# Patient Record
Sex: FEMALE | Race: WHITE | NOT HISPANIC OR LATINO
[De-identification: names, ages, dates, MRNs, and addresses within clinical notes are randomized per-mention and may not be internally consistent; named-entity substitution may affect disease eponyms.]

---

## 2021-05-25 PROBLEM — Z00.00 ENCOUNTER FOR PREVENTIVE HEALTH EXAMINATION: Status: ACTIVE | Noted: 2021-05-25

## 2021-05-26 ENCOUNTER — APPOINTMENT (OUTPATIENT)
Dept: OBGYN | Facility: CLINIC | Age: 36
End: 2021-05-26
Payer: COMMERCIAL

## 2021-05-26 VITALS
HEIGHT: 63 IN | BODY MASS INDEX: 28.35 KG/M2 | WEIGHT: 160 LBS | DIASTOLIC BLOOD PRESSURE: 80 MMHG | SYSTOLIC BLOOD PRESSURE: 120 MMHG

## 2021-05-26 DIAGNOSIS — F17.200 NICOTINE DEPENDENCE, UNSPECIFIED, UNCOMPLICATED: ICD-10-CM

## 2021-05-26 DIAGNOSIS — Z78.9 OTHER SPECIFIED HEALTH STATUS: ICD-10-CM

## 2021-05-26 DIAGNOSIS — Z87.09 PERSONAL HISTORY OF OTHER DISEASES OF THE RESPIRATORY SYSTEM: ICD-10-CM

## 2021-05-26 PROCEDURE — 99214 OFFICE O/P EST MOD 30 MIN: CPT | Mod: 25

## 2021-05-26 PROCEDURE — 99385 PREV VISIT NEW AGE 18-39: CPT

## 2021-05-26 PROCEDURE — 99072 ADDL SUPL MATRL&STAF TM PHE: CPT

## 2021-05-26 NOTE — HISTORY OF PRESENT ILLNESS
[FreeTextEntry1] : 35 year old female presents for wwe.  She is a new patient to our practice.  Her last gyn visit was about 5 years ago.  She would like to discuss her menses today.  Menarche was at age 13.  Menses are q 28 days, lasting 5-8 days.  She states the first 3 days are heavy.  She does get very bad cramping.  She states the cramping is so bad that she gets nauseous and occasionally vomits.  She takes NSAIDs and tylenol which take the edge off.  She denies dyspareunia.  She sometimes has dyschezia.  She has no history of ovarian cysts, fibroids, endometriosis, STD's or abnormal pap's.  She has a history fo breast cysts.  She is sexually active and using condoms.  She is considering trying to conceive in the near future.  \par \par PMH is significant for asthma.  PSH is significant for a CS and tonsillectomy.  She does not know her family history as she is adopted.  She does smoke a few cigarettes per day.  She socially drinks alcohol.  Denies illicit drugs.  Gyn history as above.  OB history:  (FT CS).  NKDA.  She does not take any medications.

## 2021-05-26 NOTE — PLAN
[FreeTextEntry1] : 35 year old female wwe\par \par 1. Pap done\par 2. SBE reviewed\par 3. Heavy menses and dysmenorrhea.  Differential diagnosis discussed with the patient at length.  Plan to get CBC and TSH.  Will RTO for tv sono and to review blood work results.  We discussed potential treatment options.  Patient is unsure if she would like to try to conceive soon or wait.  She will discuss with her  before her next visit.  The opportunity was given to ask questions and all were answered to her satisfaction.

## 2021-06-03 LAB
CYTOLOGY CVX/VAG DOC THIN PREP: ABNORMAL
HPV HIGH+LOW RISK DNA PNL CVX: NOT DETECTED

## 2021-07-05 LAB
BASOPHILS # BLD AUTO: 0.09 K/UL
BASOPHILS NFR BLD AUTO: 1 %
EOSINOPHIL # BLD AUTO: 0.41 K/UL
EOSINOPHIL NFR BLD AUTO: 4.5 %
HCT VFR BLD CALC: 43.7 %
HGB BLD-MCNC: 14.7 G/DL
IMM GRANULOCYTES NFR BLD AUTO: 0.2 %
LYMPHOCYTES # BLD AUTO: 3.43 K/UL
LYMPHOCYTES NFR BLD AUTO: 37.9 %
MAN DIFF?: NORMAL
MCHC RBC-ENTMCNC: 32 PG
MCHC RBC-ENTMCNC: 33.6 GM/DL
MCV RBC AUTO: 95.2 FL
MONOCYTES # BLD AUTO: 0.49 K/UL
MONOCYTES NFR BLD AUTO: 5.4 %
NEUTROPHILS # BLD AUTO: 4.62 K/UL
NEUTROPHILS NFR BLD AUTO: 51 %
PLATELET # BLD AUTO: 278 K/UL
RBC # BLD: 4.59 M/UL
RBC # FLD: 13 %
TSH SERPL-ACNC: 1.85 UIU/ML
WBC # FLD AUTO: 9.06 K/UL

## 2021-08-21 ENCOUNTER — APPOINTMENT (OUTPATIENT)
Dept: OBGYN | Facility: CLINIC | Age: 36
End: 2021-08-21

## 2022-12-20 ENCOUNTER — APPOINTMENT (OUTPATIENT)
Dept: OBGYN | Facility: CLINIC | Age: 37
End: 2022-12-20

## 2022-12-20 ENCOUNTER — NON-APPOINTMENT (OUTPATIENT)
Age: 37
End: 2022-12-20

## 2022-12-20 VITALS
BODY MASS INDEX: 27.29 KG/M2 | SYSTOLIC BLOOD PRESSURE: 122 MMHG | HEIGHT: 63 IN | DIASTOLIC BLOOD PRESSURE: 76 MMHG | WEIGHT: 154 LBS

## 2022-12-20 DIAGNOSIS — Z01.419 ENCOUNTER FOR GYNECOLOGICAL EXAMINATION (GENERAL) (ROUTINE) W/OUT ABNORMAL FINDINGS: ICD-10-CM

## 2022-12-20 DIAGNOSIS — N94.6 DYSMENORRHEA, UNSPECIFIED: ICD-10-CM

## 2022-12-20 DIAGNOSIS — N92.0 EXCESSIVE AND FREQUENT MENSTRUATION WITH REGULAR CYCLE: ICD-10-CM

## 2022-12-20 PROCEDURE — 99395 PREV VISIT EST AGE 18-39: CPT

## 2022-12-20 NOTE — PLAN
[FreeTextEntry1] : 36 year old female wwe\par \par 1. Pap done\par 2. SBE reviewed\par 3. Heavy menses and dysmenorrhea.  Differential diagnosis discussed with the patient.  Plan to get CBC and TSH.  Will RTO for tv sono and to review blood work results.  We discussed potential treatment options.  Patient is planning on TTC soon.  The opportunity was given to ask questions and all were answered to her satisfaction.  \par 4. Preconceptual counseling was reviewed with the patient.  We discussed the menstrual cycle, ovulation, ovulation prediction kits, timed intercourse, etc.  We discussed advanced maternal age.  We discussed that it can take 1 year to get pregnant.  We discussed that due to AMA she can have SHIV intervention at 6 months of trying to conceive if she desires.  Advised patient to start prenatal vitamins.\par 5.  Annual exam in 1 year

## 2023-01-09 LAB
CYTOLOGY CVX/VAG DOC THIN PREP: ABNORMAL
HPV HIGH+LOW RISK DNA PNL CVX: NOT DETECTED

## 2023-02-15 ENCOUNTER — APPOINTMENT (OUTPATIENT)
Dept: OBGYN | Facility: CLINIC | Age: 38
End: 2023-02-15

## 2023-02-15 ENCOUNTER — APPOINTMENT (OUTPATIENT)
Dept: ANTEPARTUM | Facility: CLINIC | Age: 38
End: 2023-02-15

## 2024-08-17 ENCOUNTER — APPOINTMENT (OUTPATIENT)
Dept: OBGYN | Facility: CLINIC | Age: 39
End: 2024-08-17
Payer: COMMERCIAL

## 2024-08-17 VITALS
SYSTOLIC BLOOD PRESSURE: 104 MMHG | WEIGHT: 153 LBS | BODY MASS INDEX: 27.11 KG/M2 | HEIGHT: 63 IN | DIASTOLIC BLOOD PRESSURE: 60 MMHG

## 2024-08-17 DIAGNOSIS — Z01.419 ENCOUNTER FOR GYNECOLOGICAL EXAMINATION (GENERAL) (ROUTINE) W/OUT ABNORMAL FINDINGS: ICD-10-CM

## 2024-08-17 PROCEDURE — 99395 PREV VISIT EST AGE 18-39: CPT

## 2024-08-17 NOTE — HISTORY OF PRESENT ILLNESS
[FreeTextEntry1] : 38 year old female presents for wwe. She is currently trying to conceive, taking PNV. She thinks she had 2 miscarriages in the past 2 months. Would like to see SHIV. Menarche was at age 13.  Menses are q 28 days, lasting 5-8 days.  She states the first 3 days are heavy.  She does get very bad cramping.  She states the cramping is so bad that she gets nauseous and occasionally vomits.  She takes NSAIDs and tylenol which take the edge off.  She denies dyspareunia.  She sometimes has dyschezia.  She has no history of ovarian cysts, fibroids, endometriosis, STD's or abnormal pap's.    PMH is significant for asthma.  PSH is significant for a CS and tonsillectomy.  She does not know her family history as she is adopted.  She does smoke a few cigarettes per day.  She socially drinks alcohol.  Denies illicit drugs.  Gyn history as above.  OB history:  (FT CS).  NKDA.  She does not take any medications.

## 2024-08-23 LAB — CYTOLOGY CVX/VAG DOC THIN PREP: ABNORMAL

## 2024-08-30 LAB — HPV HIGH+LOW RISK DNA PNL CVX: NOT DETECTED
